# Patient Record
Sex: MALE | Race: BLACK OR AFRICAN AMERICAN | NOT HISPANIC OR LATINO | Employment: OTHER | URBAN - METROPOLITAN AREA
[De-identification: names, ages, dates, MRNs, and addresses within clinical notes are randomized per-mention and may not be internally consistent; named-entity substitution may affect disease eponyms.]

---

## 2017-01-27 ENCOUNTER — OFFICE VISIT (OUTPATIENT)
Dept: RETAIL CLINIC | Facility: CLINIC | Age: 32
End: 2017-01-27

## 2017-01-27 DIAGNOSIS — Z02.83 ENCOUNTER FOR DRUG SCREENING: Primary | ICD-10-CM

## 2019-04-05 ENCOUNTER — TRANSCRIBE ORDERS (OUTPATIENT)
Dept: ADMINISTRATIVE | Facility: HOSPITAL | Age: 34
End: 2019-04-05

## 2019-04-05 DIAGNOSIS — M54.32 BILATERAL SCIATICA: Primary | ICD-10-CM

## 2019-04-05 DIAGNOSIS — M54.31 BILATERAL SCIATICA: Primary | ICD-10-CM

## 2019-04-11 ENCOUNTER — HOSPITAL ENCOUNTER (OUTPATIENT)
Dept: MRI IMAGING | Facility: HOSPITAL | Age: 34
Discharge: HOME | End: 2019-04-11
Admitting: ORTHOPAEDIC SURGERY

## 2019-04-11 DIAGNOSIS — M54.32 BILATERAL SCIATICA: ICD-10-CM

## 2019-04-11 DIAGNOSIS — M54.31 BILATERAL SCIATICA: ICD-10-CM

## 2019-04-11 PROCEDURE — 72148 MRI LUMBAR SPINE W/O DYE: CPT

## 2024-03-25 ENCOUNTER — HOSPITAL ENCOUNTER (EMERGENCY)
Facility: HOSPITAL | Age: 39
Discharge: HOME | End: 2024-03-25
Attending: EMERGENCY MEDICINE | Admitting: EMERGENCY MEDICINE
Payer: COMMERCIAL

## 2024-03-25 ENCOUNTER — APPOINTMENT (OUTPATIENT)
Dept: GENERAL RADIOLOGY | Facility: HOSPITAL | Age: 39
End: 2024-03-25
Payer: COMMERCIAL

## 2024-03-25 VITALS
SYSTOLIC BLOOD PRESSURE: 115 MMHG | OXYGEN SATURATION: 98 % | RESPIRATION RATE: 14 BRPM | BODY MASS INDEX: 23.86 KG/M2 | WEIGHT: 180 LBS | DIASTOLIC BLOOD PRESSURE: 81 MMHG | TEMPERATURE: 97.8 F | HEIGHT: 73 IN | HEART RATE: 69 BPM

## 2024-03-25 DIAGNOSIS — R07.9 CHEST PAIN, UNSPECIFIED TYPE: Primary | ICD-10-CM

## 2024-03-25 LAB
ALBUMIN SERPL-MCNC: 4.8 G/DL (ref 3.5–5.2)
ALBUMIN/GLOB SERPL: 2.1 G/DL
ALP SERPL-CCNC: 72 U/L (ref 39–117)
ALT SERPL W P-5'-P-CCNC: 16 U/L (ref 1–41)
ANION GAP SERPL CALCULATED.3IONS-SCNC: 11.9 MMOL/L (ref 5–15)
AST SERPL-CCNC: 21 U/L (ref 1–40)
BASOPHILS # BLD AUTO: 0.04 10*3/MM3 (ref 0–0.2)
BASOPHILS NFR BLD AUTO: 0.4 % (ref 0–1.5)
BILIRUB SERPL-MCNC: 0.3 MG/DL (ref 0–1.2)
BUN SERPL-MCNC: 19 MG/DL (ref 6–20)
BUN/CREAT SERPL: 15.3 (ref 7–25)
CALCIUM SPEC-SCNC: 9.3 MG/DL (ref 8.6–10.5)
CHLORIDE SERPL-SCNC: 104 MMOL/L (ref 98–107)
CO2 SERPL-SCNC: 24.1 MMOL/L (ref 22–29)
CREAT SERPL-MCNC: 1.24 MG/DL (ref 0.76–1.27)
D DIMER PPP FEU-MCNC: <0.27 MCGFEU/ML (ref 0–0.5)
DEPRECATED RDW RBC AUTO: 42 FL (ref 37–54)
EGFRCR SERPLBLD CKD-EPI 2021: 75.4 ML/MIN/1.73
EOSINOPHIL # BLD AUTO: 0.22 10*3/MM3 (ref 0–0.4)
EOSINOPHIL NFR BLD AUTO: 2.4 % (ref 0.3–6.2)
ERYTHROCYTE [DISTWIDTH] IN BLOOD BY AUTOMATED COUNT: 13.3 % (ref 12.3–15.4)
GEN 5 2HR TROPONIN T REFLEX: <6 NG/L
GLOBULIN UR ELPH-MCNC: 2.3 GM/DL
GLUCOSE SERPL-MCNC: 118 MG/DL (ref 65–99)
HCT VFR BLD AUTO: 42.8 % (ref 37.5–51)
HGB BLD-MCNC: 15.4 G/DL (ref 13–17.7)
IMM GRANULOCYTES # BLD AUTO: 0.04 10*3/MM3 (ref 0–0.05)
IMM GRANULOCYTES NFR BLD AUTO: 0.4 % (ref 0–0.5)
LYMPHOCYTES # BLD AUTO: 2.86 10*3/MM3 (ref 0.7–3.1)
LYMPHOCYTES NFR BLD AUTO: 30.7 % (ref 19.6–45.3)
MCH RBC QN AUTO: 31 PG (ref 26.6–33)
MCHC RBC AUTO-ENTMCNC: 36 G/DL (ref 31.5–35.7)
MCV RBC AUTO: 86.1 FL (ref 79–97)
MONOCYTES # BLD AUTO: 0.76 10*3/MM3 (ref 0.1–0.9)
MONOCYTES NFR BLD AUTO: 8.1 % (ref 5–12)
NEUTROPHILS NFR BLD AUTO: 5.41 10*3/MM3 (ref 1.7–7)
NEUTROPHILS NFR BLD AUTO: 58 % (ref 42.7–76)
NRBC BLD AUTO-RTO: 0 /100 WBC (ref 0–0.2)
NT-PROBNP SERPL-MCNC: <36 PG/ML (ref 0–450)
PLATELET # BLD AUTO: 218 10*3/MM3 (ref 140–450)
PMV BLD AUTO: 9.6 FL (ref 6–12)
POTASSIUM SERPL-SCNC: 3.6 MMOL/L (ref 3.5–5.2)
PROT SERPL-MCNC: 7.1 G/DL (ref 6–8.5)
RBC # BLD AUTO: 4.97 10*6/MM3 (ref 4.14–5.8)
SODIUM SERPL-SCNC: 140 MMOL/L (ref 136–145)
TROPONIN T DELTA: NORMAL
TROPONIN T SERPL HS-MCNC: <6 NG/L
WBC NRBC COR # BLD AUTO: 9.33 10*3/MM3 (ref 3.4–10.8)

## 2024-03-25 PROCEDURE — 93010 ELECTROCARDIOGRAM REPORT: CPT | Performed by: INTERNAL MEDICINE

## 2024-03-25 PROCEDURE — 80053 COMPREHEN METABOLIC PANEL: CPT

## 2024-03-25 PROCEDURE — 85025 COMPLETE CBC W/AUTO DIFF WBC: CPT

## 2024-03-25 PROCEDURE — 71045 X-RAY EXAM CHEST 1 VIEW: CPT

## 2024-03-25 PROCEDURE — 84484 ASSAY OF TROPONIN QUANT: CPT

## 2024-03-25 PROCEDURE — 36415 COLL VENOUS BLD VENIPUNCTURE: CPT

## 2024-03-25 PROCEDURE — 83880 ASSAY OF NATRIURETIC PEPTIDE: CPT | Performed by: EMERGENCY MEDICINE

## 2024-03-25 PROCEDURE — 85379 FIBRIN DEGRADATION QUANT: CPT | Performed by: EMERGENCY MEDICINE

## 2024-03-25 PROCEDURE — 99284 EMERGENCY DEPT VISIT MOD MDM: CPT

## 2024-03-25 PROCEDURE — 93005 ELECTROCARDIOGRAM TRACING: CPT

## 2024-03-25 RX ORDER — SODIUM CHLORIDE 0.9 % (FLUSH) 0.9 %
10 SYRINGE (ML) INJECTION AS NEEDED
Status: DISCONTINUED | OUTPATIENT
Start: 2024-03-25 | End: 2024-03-26 | Stop reason: HOSPADM

## 2024-03-25 RX ORDER — ASPIRIN 325 MG
325 TABLET ORAL ONCE
Status: COMPLETED | OUTPATIENT
Start: 2024-03-25 | End: 2024-03-25

## 2024-03-25 RX ADMIN — ASPIRIN 325 MG: 325 TABLET ORAL at 19:32

## 2024-03-25 NOTE — ED PROVIDER NOTES
Subjective   History of Present Illness    Chief complaint: Chest pain    Location: Substernal    Quality/Severity: Moderate    Timing/Onset/Duration: Intermittent for the last 3 weeks, last episode 3 days ago    Modifying Factors: Worse with exertion    Associated Symptoms: No headache.  No fever chills or cough.  No sore throat earache or nasal congestion.  No abdominal pain.  No diarrhea or burning when he urinates.  No nausea or vomiting.    Narrative: This 38-year-old presents with intermittent chest pain and dyspnea on exertion for the last 3 weeks.  The patient is not diabetic.  He does smoke.  He had an elevated blood pressure at urgent care but does not routinely see a doctor.  Does not have diabetes or elevated cholesterol.  The patient has no family history of coronary artery disease.  The patient is never had an MI.  The patient's never had a blood clot in the leg or lung.  No recent long trips or surgeries.  No bleeding or clotting problems.  Patient is not on hormone therapy.  He has not had cancer.    PCP: No PCP    Review of Systems   Constitutional:  Negative for chills and fever.   HENT:  Negative for congestion, ear pain and sore throat.    Respiratory:  Positive for shortness of breath. Negative for cough.    Cardiovascular:  Positive for chest pain.   Gastrointestinal:  Negative for abdominal pain, nausea and vomiting.   Genitourinary:  Negative for difficulty urinating.   Musculoskeletal:  Negative for back pain.   Skin:  Negative for rash.   Neurological:  Negative for headaches.         History reviewed. No pertinent past medical history.    No Known Allergies    Past Surgical History:   Procedure Laterality Date   • BACK SURGERY         History reviewed. No pertinent family history.    Social History     Socioeconomic History   • Marital status: Single   Tobacco Use   • Smoking status: Every Day     Current packs/day: 0.50     Types: Cigarettes   • Smokeless tobacco: Never   Vaping Use   •  Vaping status: Never Used   Substance and Sexual Activity   • Alcohol use: Defer   • Drug use: Yes     Types: Marijuana   • Sexual activity: Defer           Objective   Physical Exam  Constitutional:       Appearance: He is well-developed.   HENT:      Head: Normocephalic and atraumatic.   Cardiovascular:      Rate and Rhythm: Normal rate and regular rhythm.      Heart sounds: No murmur heard.     No friction rub. No gallop.   Pulmonary:      Effort: Pulmonary effort is normal.      Breath sounds: Normal breath sounds.   Chest:      Chest wall: No tenderness.   Abdominal:      General: Bowel sounds are normal.      Palpations: Abdomen is soft. There is no mass.      Tenderness: There is no abdominal tenderness. There is no guarding or rebound.   Musculoskeletal:         General: Normal range of motion.      Cervical back: Normal range of motion and neck supple.      Right lower leg: No edema.      Left lower leg: No edema.   Skin:     General: Skin is warm and dry.      Findings: No rash.   Neurological:      General: No focal deficit present.      Mental Status: He is alert and oriented to person, place, and time.         Procedures           ED Course  ED Course as of 03/25/24 2311   Mon Mar 25, 2024   1935 CBC is unremarkable [RC]   2010 The high-sensitivity troponin is less than 6 and normal. [RC]   2010 The D-dimer is less than 0.27 and normal. [RC]   2010 Glucose is 118, CMP is otherwise unremarkable. [RC]   2159 Troponin is less than 6 with a delta T of 0. [RC]      ED Course User Index  [RC] dEvin Munson MD      19:27 EDT, 03/25/24:  The EKG was obtained at 1924 and read by me at 19.4.  The EKG shows a normal sinus rhythm with rate of 68.  There is a normal axis with no hypertrophy.  The HI, QRS, QT intervals are unremarkable.  There is no ectopy.  There is no acute ST elevation or depression.                                       Medical Decision Making      Final diagnoses:   None       ED  Disposition  ED Disposition       None            No follow-up provider specified.       Medication List      No changes were made to your prescriptions during this visit.            Edvin Munson MD  03/25/24 0953

## 2024-03-26 LAB
QT INTERVAL: 361 MS
QTC INTERVAL: 383 MS

## 2024-03-26 NOTE — DISCHARGE INSTRUCTIONS
Call the patient connection line in the morning for an appointment with local cardiology within 1 week.  Turn to the emergency department if there is worsening pain, shortness of breath, worse in any way at all.  Take a baby aspirin a day.

## 2024-04-01 ENCOUNTER — HOSPITAL ENCOUNTER (OUTPATIENT)
Dept: CARDIOLOGY | Facility: HOSPITAL | Age: 39
Discharge: HOME | End: 2024-04-01
Admitting: INTERNAL MEDICINE
Payer: COMMERCIAL

## 2024-04-01 ENCOUNTER — OFFICE VISIT (OUTPATIENT)
Dept: CARDIOLOGY | Facility: CLINIC | Age: 39
End: 2024-04-01
Payer: COMMERCIAL

## 2024-04-01 VITALS
HEIGHT: 73 IN | SYSTOLIC BLOOD PRESSURE: 120 MMHG | WEIGHT: 180 LBS | HEART RATE: 66 BPM | DIASTOLIC BLOOD PRESSURE: 80 MMHG | BODY MASS INDEX: 23.86 KG/M2

## 2024-04-01 DIAGNOSIS — R00.2 PALPITATIONS: Primary | ICD-10-CM

## 2024-04-01 DIAGNOSIS — R07.2 PRECORDIAL CHEST PAIN: ICD-10-CM

## 2024-04-01 DIAGNOSIS — R00.2 PALPITATIONS: ICD-10-CM

## 2024-04-01 PROCEDURE — 93225 XTRNL ECG REC<48 HRS REC: CPT

## 2024-04-01 PROCEDURE — 93000 ELECTROCARDIOGRAM COMPLETE: CPT | Performed by: INTERNAL MEDICINE

## 2024-04-01 PROCEDURE — 99204 OFFICE O/P NEW MOD 45 MIN: CPT | Performed by: INTERNAL MEDICINE

## 2024-04-01 PROCEDURE — 93226 XTRNL ECG REC<48 HR SCAN A/R: CPT

## 2024-04-01 NOTE — PROGRESS NOTES
PATIENTINFORMATION    Date of Office Visit: 2024  Encounter Provider: Jorge Floyd MD  Place of Service: [facility] CARDIOLOG[facility]  Patient Name: Dar Mckeon  : 1985    Subjective:     Encounter Date:2024      Patient ID: Dar Mckeon is a 38 y.o. male.    No chief complaint on file.    HPI  Mr. Mckeon is a pleasant 38 years old gentleman came to cardiac clinic for evaluation of chest tightness and presyncopal spells.  He was accompanied by his wife and daughter.  The symptoms have been going on for the past 1-2 months coming intermittently almost every other day.  He describes spells as chest feeling very tight along with some heart palpitations and feeling as if he is going to pass out and comes both during activities and rest and usually relieved with resting.  He had an episode yesterday while shopping in a grocery store that lasted for about 30-40 minutes.  He denies passing out.  He has some mild shortness of breath but denies any orthopnea, PND or extremity swelling.  No prior known cardiovascular illness.  No family history of premature coronary artery disease.  Not on prescription medications.   No significant recent life event changes.  No prior stress testing or coronary angiogram.  He went to the ER on 3/25/2024-unremarkable workup.    ROS  All systems reviewed and negative except as noted in HPI.    History reviewed. No pertinent past medical history.    Past Surgical History:   Procedure Laterality Date    BACK SURGERY         Social History     Socioeconomic History    Marital status:    Tobacco Use    Smoking status: Every Day     Current packs/day: 0.50     Types: Cigarettes    Smokeless tobacco: Never   Vaping Use    Vaping status: Never Used   Substance and Sexual Activity    Alcohol use: Defer    Drug use: Yes     Types: Marijuana    Sexual activity: Defer       History reviewed. No pertinent family history.        ECG 12 Lead    Date/Time:  "4/1/2024 11:39 AM  Performed by: Jorge Floyd MD    Authorized by: Jorge Floyd MD  Comparison: compared with previous ECG from 3/25/2024  Similar to previous ECG  Rhythm: sinus rhythm  Rate: normal  Conduction: conduction normal  ST Segments: ST segments normal  T Waves: T waves normal  QRS axis: normal  Other: no other findings    Clinical impression: normal ECG             Objective:     /80 (BP Location: Left arm)   Pulse 66   Ht 185.4 cm (73\")   Wt 81.6 kg (180 lb)   BMI 23.75 kg/m²  Body mass index is 23.75 kg/m².     Constitutional:       General: Not in acute distress.     Appearance: Well-developed. Not diaphoretic.   Eyes:      Pupils: Pupils are equal, round, and reactive to light.   HENT:      Head: Normocephalic and atraumatic.   Neck:      Thyroid: No thyromegaly.   Pulmonary:      Effort: Pulmonary effort is normal. No respiratory distress.      Breath sounds: Normal breath sounds. No wheezing. No rales.   Chest:      Chest wall: Not tender to palpatation.   Cardiovascular:      Normal rate. Regular rhythm.      No gallop.    Pulses:     Intact distal pulses.   Edema:     Peripheral edema absent.   Abdominal:      General: Bowel sounds are normal. There is no distension.      Palpations: Abdomen is soft.      Tenderness: There is no guarding.   Musculoskeletal: Normal range of motion.         General: No deformity.      Cervical back: Normal range of motion and neck supple. Skin:     General: Skin is warm and dry.      Findings: No rash.   Neurological:      Mental Status: Alert and oriented to person, place, and time.      Cranial Nerves: No cranial nerve deficit.      Deep Tendon Reflexes: Reflexes are normal and symmetric.   Psychiatric:         Judgment: Judgment normal.       Review Of Data: I have reviewed pertinent recent labs, images and documents and pertinent findings included in HPI or assessment below.          Assessment/Plan:         Intermittent chest " tightness with palpitations and presyncopal spell-rule out arrhythmia  Precordial chest pain  48 Holter Holter-if no events occur then order prolonged heart monitoring.  Myocardial perfusion study to rule out any significant coronary artery disease.    Diagnosis and plan of care discussed with patient and verbalized understanding.            Your medication list      as of April 1, 2024 11:39 AM     You have not been prescribed any medications.             Jorge Floyd MD  04/01/24  11:39 EDT

## 2024-04-04 ENCOUNTER — TELEPHONE (OUTPATIENT)
Dept: CARDIOLOGY | Facility: CLINIC | Age: 39
End: 2024-04-04
Payer: COMMERCIAL

## 2024-04-04 NOTE — TELEPHONE ENCOUNTER
----- Message from Jorge Floyd MD sent at 4/4/2024 12:12 PM EDT -----  Please notify Mr. Mckeon that Holter is normal.  Can you ask if he had any events while wearing Holter?  Thank you

## 2024-04-04 NOTE — PROGRESS NOTES
Please notify Mr. Mckeon that Holter is normal.  Can you ask if he had any events while wearing Holter?  Thank you

## 2024-04-04 NOTE — TELEPHONE ENCOUNTER
Called and left VM, will continue to try to reach pt.    HUB- please put patient straight through to triage    Latrice Weinstein RN  Triage RN  04/04/24 12:14 EDT

## 2024-04-05 NOTE — TELEPHONE ENCOUNTER
Results called to pt.  Instructed to call with any further questions or concerns.  Verbalized understanding.    Dr. Floyd- pt states that he had a few chest pains, but no other symptoms/episodes while he was wearing holter monitor    Latrice Weinstein RN  Triage Nurse, [facility] [place]  04/05/24 09:22 EDT

## 2024-04-15 ENCOUNTER — TELEPHONE (OUTPATIENT)
Dept: CARDIOLOGY | Facility: CLINIC | Age: 39
End: 2024-04-15
Payer: COMMERCIAL

## 2024-04-16 ENCOUNTER — TELEPHONE (OUTPATIENT)
Dept: CARDIOLOGY | Facility: CLINIC | Age: 39
End: 2024-04-16
Payer: COMMERCIAL

## 2024-04-16 DIAGNOSIS — R07.2 PRECORDIAL CHEST PAIN: Primary | ICD-10-CM

## 2024-04-16 DIAGNOSIS — R00.2 PALPITATIONS: ICD-10-CM

## 2024-04-16 NOTE — TELEPHONE ENCOUNTER
Spoke to Fatemeh. Peer to peer must be completed by APRN, PA, or MD. We have 7 calendar days from the date of denial to complete this. The case was denied on 4/15/24. Peer to peers do not need to be scheduled, you can call and they will transfer you straight through.     Elissa Carlos RN  [place]riage [facility] [place]

## 2024-04-16 NOTE — TELEPHONE ENCOUNTER
Called and tried to get approval. They are only willing to get a treadmill stress completed at this time.   I put in a new order for this

## 2024-04-16 NOTE — TELEPHONE ENCOUNTER
----- Message from Latricia Ericksont sent at 4/15/2024  2:42 PM EDT -----  Regarding: Peer to Peer request  Hello All,     The stress echo ordered by Dr. Floyd is pending further information in the form of a peer to peer.   The clinical information received does not support medical necessity.      They are requesting additional information of:  1. Why the doctor is asking for this test. (If the test uses medicines to stress the heart, tell us what medicines.)   For the things requested below, attach sheet or notes.  2. Member height and weight   3. Symptoms and how long they have been going on   4. Past heart therapies and procedures   5. Doctor's notes from the physical exam   6. Results and dates of past heart tests, electrical evaluation, exercise tests, sound wave testing, blood flow   testing, pictures of the heart's blood vessels or pumping chambers  7. Medicines  8. Other non-heart diseases that could affect heart testing, including the ability to walk and exercise.    ##All this info is available in the notes except for  6.  He has not had any other cardiac testing  7.  He is not on any medications.       The Ordering Physician can schedule a [id number] discussion with a Physician Reviewer or your   representative with clinical information can call [phone].      PATIENT: Dar Mckeon   PHYSICIAN: Dr. Jorge Floyd  MEMBER ID: [id number]   MEMBER : 1985  HEALTH PLAN: [facility]  [employer]      Thank you

## 2024-04-19 ENCOUNTER — TELEPHONE (OUTPATIENT)
Dept: CARDIOLOGY | Facility: CLINIC | Age: 39
End: 2024-04-19
Payer: COMMERCIAL
